# Patient Record
Sex: MALE | Race: WHITE | NOT HISPANIC OR LATINO | URBAN - METROPOLITAN AREA
[De-identification: names, ages, dates, MRNs, and addresses within clinical notes are randomized per-mention and may not be internally consistent; named-entity substitution may affect disease eponyms.]

---

## 2021-05-26 ENCOUNTER — EMERGENCY (EMERGENCY)
Facility: HOSPITAL | Age: 56
LOS: 0 days | Discharge: HOME | End: 2021-05-27
Attending: EMERGENCY MEDICINE
Payer: COMMERCIAL

## 2021-05-26 VITALS — WEIGHT: 210.1 LBS | HEIGHT: 70 IN

## 2021-05-26 DIAGNOSIS — R10.31 RIGHT LOWER QUADRANT PAIN: ICD-10-CM

## 2021-05-26 DIAGNOSIS — N13.2 HYDRONEPHROSIS WITH RENAL AND URETERAL CALCULOUS OBSTRUCTION: ICD-10-CM

## 2021-05-26 LAB
APPEARANCE UR: CLEAR — SIGNIFICANT CHANGE UP
APTT BLD: 22.6 SEC — LOW (ref 27.5–35.5)
BASOPHILS # BLD AUTO: 0 K/UL — SIGNIFICANT CHANGE UP (ref 0–0.2)
BASOPHILS NFR BLD AUTO: 0 % — SIGNIFICANT CHANGE UP (ref 0–2)
BILIRUB UR-MCNC: NEGATIVE — SIGNIFICANT CHANGE UP
COLOR SPEC: YELLOW — SIGNIFICANT CHANGE UP
DIFF PNL FLD: ABNORMAL
EOSINOPHIL # BLD AUTO: 1.26 K/UL — HIGH (ref 0–0.5)
EOSINOPHIL NFR BLD AUTO: 9 % — HIGH (ref 0–6)
GLUCOSE UR QL: NEGATIVE MG/DL — SIGNIFICANT CHANGE UP
HCT VFR BLD CALC: 47.1 % — SIGNIFICANT CHANGE UP (ref 39–50)
HGB BLD-MCNC: 16.6 G/DL — SIGNIFICANT CHANGE UP (ref 13–17)
INR BLD: 1.35 RATIO — HIGH (ref 0.88–1.16)
KETONES UR-MCNC: NEGATIVE — SIGNIFICANT CHANGE UP
LEUKOCYTE ESTERASE UR-ACNC: NEGATIVE — SIGNIFICANT CHANGE UP
LYMPHOCYTES # BLD AUTO: 17 % — SIGNIFICANT CHANGE UP (ref 13–44)
LYMPHOCYTES # BLD AUTO: 2.38 K/UL — SIGNIFICANT CHANGE UP (ref 1–3.3)
MCHC RBC-ENTMCNC: 30.2 PG — SIGNIFICANT CHANGE UP (ref 27–34)
MCHC RBC-ENTMCNC: 35.2 GM/DL — SIGNIFICANT CHANGE UP (ref 32–36)
MCV RBC AUTO: 85.6 FL — SIGNIFICANT CHANGE UP (ref 80–100)
MONOCYTES # BLD AUTO: 1.26 K/UL — HIGH (ref 0–0.9)
MONOCYTES NFR BLD AUTO: 9 % — SIGNIFICANT CHANGE UP (ref 2–14)
NEUTROPHILS # BLD AUTO: 8.95 K/UL — HIGH (ref 1.8–7.4)
NEUTROPHILS NFR BLD AUTO: 64 % — SIGNIFICANT CHANGE UP (ref 43–77)
NITRITE UR-MCNC: NEGATIVE — SIGNIFICANT CHANGE UP
NRBC # BLD: SIGNIFICANT CHANGE UP /100 WBCS (ref 0–0)
PH UR: 5 — SIGNIFICANT CHANGE UP (ref 5–8)
PLATELET # BLD AUTO: 288 K/UL — SIGNIFICANT CHANGE UP (ref 150–400)
POTASSIUM SERPL-MCNC: 4 MMOL/L — SIGNIFICANT CHANGE UP (ref 3.5–5.3)
POTASSIUM SERPL-SCNC: 4 MMOL/L — SIGNIFICANT CHANGE UP (ref 3.5–5.3)
PROT UR-MCNC: NEGATIVE MG/DL — SIGNIFICANT CHANGE UP
PROTHROM AB SERPL-ACNC: 15.4 SEC — HIGH (ref 10.6–13.6)
RBC # BLD: 5.5 M/UL — SIGNIFICANT CHANGE UP (ref 4.2–5.8)
RBC # FLD: 12.7 % — SIGNIFICANT CHANGE UP (ref 10.3–14.5)
SP GR SPEC: 1.02 — SIGNIFICANT CHANGE UP (ref 1.01–1.02)
UROBILINOGEN FLD QL: NEGATIVE MG/DL — SIGNIFICANT CHANGE UP
WBC # BLD: 13.99 K/UL — HIGH (ref 3.8–10.5)
WBC # FLD AUTO: 13.99 K/UL — HIGH (ref 3.8–10.5)

## 2021-05-26 PROCEDURE — 71045 X-RAY EXAM CHEST 1 VIEW: CPT

## 2021-05-26 PROCEDURE — 99285 EMERGENCY DEPT VISIT HI MDM: CPT

## 2021-05-26 PROCEDURE — 85730 THROMBOPLASTIN TIME PARTIAL: CPT

## 2021-05-26 PROCEDURE — 99284 EMERGENCY DEPT VISIT MOD MDM: CPT | Mod: 25

## 2021-05-26 PROCEDURE — 80053 COMPREHEN METABOLIC PANEL: CPT

## 2021-05-26 PROCEDURE — 86900 BLOOD TYPING SEROLOGIC ABO: CPT

## 2021-05-26 PROCEDURE — 81001 URINALYSIS AUTO W/SCOPE: CPT

## 2021-05-26 PROCEDURE — 86901 BLOOD TYPING SEROLOGIC RH(D): CPT

## 2021-05-26 PROCEDURE — 96374 THER/PROPH/DIAG INJ IV PUSH: CPT | Mod: XU

## 2021-05-26 PROCEDURE — 83690 ASSAY OF LIPASE: CPT

## 2021-05-26 PROCEDURE — 74177 CT ABD & PELVIS W/CONTRAST: CPT

## 2021-05-26 PROCEDURE — 36415 COLL VENOUS BLD VENIPUNCTURE: CPT

## 2021-05-26 PROCEDURE — 71045 X-RAY EXAM CHEST 1 VIEW: CPT | Mod: 26

## 2021-05-26 PROCEDURE — 85610 PROTHROMBIN TIME: CPT

## 2021-05-26 PROCEDURE — 85025 COMPLETE CBC W/AUTO DIFF WBC: CPT

## 2021-05-26 PROCEDURE — 86850 RBC ANTIBODY SCREEN: CPT

## 2021-05-26 RX ORDER — SODIUM CHLORIDE 9 MG/ML
1000 INJECTION INTRAMUSCULAR; INTRAVENOUS; SUBCUTANEOUS ONCE
Refills: 0 | Status: COMPLETED | OUTPATIENT
Start: 2021-05-26 | End: 2021-05-26

## 2021-05-26 RX ORDER — MORPHINE SULFATE 50 MG/1
4 CAPSULE, EXTENDED RELEASE ORAL ONCE
Refills: 0 | Status: DISCONTINUED | OUTPATIENT
Start: 2021-05-26 | End: 2021-05-26

## 2021-05-26 RX ORDER — ONDANSETRON 8 MG/1
4 TABLET, FILM COATED ORAL ONCE
Refills: 0 | Status: COMPLETED | OUTPATIENT
Start: 2021-05-26 | End: 2021-05-26

## 2021-05-26 RX ADMIN — SODIUM CHLORIDE 1000 MILLILITER(S): 9 INJECTION INTRAMUSCULAR; INTRAVENOUS; SUBCUTANEOUS at 23:19

## 2021-05-26 NOTE — ED ADULT TRIAGE NOTE - CHIEF COMPLAINT QUOTE
Pt. presents to ED c/o right lower quadrant abdominal pain. Pt. states pain began at appx. 5pm tonight. Denies N/V/D and fever

## 2021-05-27 VITALS
RESPIRATION RATE: 18 BRPM | DIASTOLIC BLOOD PRESSURE: 98 MMHG | HEART RATE: 83 BPM | OXYGEN SATURATION: 96 % | TEMPERATURE: 98 F | SYSTOLIC BLOOD PRESSURE: 135 MMHG

## 2021-05-27 LAB
ALBUMIN SERPL ELPH-MCNC: 4.1 G/DL — SIGNIFICANT CHANGE UP (ref 3.3–5)
ALP SERPL-CCNC: 74 U/L — SIGNIFICANT CHANGE UP (ref 40–120)
ALT FLD-CCNC: 61 U/L — SIGNIFICANT CHANGE UP (ref 12–78)
ANION GAP SERPL CALC-SCNC: 5 MMOL/L — SIGNIFICANT CHANGE UP (ref 5–17)
AST SERPL-CCNC: 45 U/L — HIGH (ref 15–37)
BILIRUB SERPL-MCNC: 0.8 MG/DL — SIGNIFICANT CHANGE UP (ref 0.2–1.2)
BUN SERPL-MCNC: 19 MG/DL — SIGNIFICANT CHANGE UP (ref 7–23)
CALCIUM SERPL-MCNC: 9.2 MG/DL — SIGNIFICANT CHANGE UP (ref 8.5–10.1)
CHLORIDE SERPL-SCNC: 105 MMOL/L — SIGNIFICANT CHANGE UP (ref 96–108)
CO2 SERPL-SCNC: 28 MMOL/L — SIGNIFICANT CHANGE UP (ref 22–31)
CREAT SERPL-MCNC: 1.93 MG/DL — HIGH (ref 0.5–1.3)
GLUCOSE SERPL-MCNC: 105 MG/DL — HIGH (ref 70–99)
LIDOCAIN IGE QN: 191 U/L — SIGNIFICANT CHANGE UP (ref 73–393)
PROT SERPL-MCNC: 7.9 GM/DL — SIGNIFICANT CHANGE UP (ref 6–8.3)
SODIUM SERPL-SCNC: 138 MMOL/L — SIGNIFICANT CHANGE UP (ref 135–145)

## 2021-05-27 PROCEDURE — 74177 CT ABD & PELVIS W/CONTRAST: CPT | Mod: 26,MA

## 2021-05-27 RX ORDER — ONDANSETRON 8 MG/1
1 TABLET, FILM COATED ORAL
Qty: 12 | Refills: 0
Start: 2021-05-27 | End: 2021-05-29

## 2021-05-27 RX ORDER — OXYCODONE HYDROCHLORIDE 5 MG/1
1 TABLET ORAL
Qty: 20 | Refills: 0
Start: 2021-05-27 | End: 2021-05-31

## 2021-05-27 RX ORDER — ACETAMINOPHEN 500 MG
1000 TABLET ORAL ONCE
Refills: 0 | Status: COMPLETED | OUTPATIENT
Start: 2021-05-27 | End: 2021-05-27

## 2021-05-27 RX ORDER — KETOROLAC TROMETHAMINE 30 MG/ML
30 SYRINGE (ML) INJECTION ONCE
Refills: 0 | Status: DISCONTINUED | OUTPATIENT
Start: 2021-05-27 | End: 2021-05-27

## 2021-05-27 RX ORDER — TAMSULOSIN HYDROCHLORIDE 0.4 MG/1
1 CAPSULE ORAL
Qty: 14 | Refills: 0
Start: 2021-05-27 | End: 2021-06-09

## 2021-05-27 RX ADMIN — Medication 30 MILLIGRAM(S): at 00:58

## 2021-05-27 NOTE — ED ADULT NURSE NOTE - OBJECTIVE STATEMENT
54 y/o a&0x4 male presents to ED c/o right lower quadrant abdominal pain. Pt. states pain began at appx. 5pm tonight. Denies N/V/D and fever, denies blood in urine or stool. pt stated he did not take anything for the pain, rates pain at an "8",

## 2021-05-27 NOTE — ED PROVIDER NOTE - PHYSICAL EXAMINATION
Gen:  Well appearing in NAD  Head:  NC/AT  HEENT: pupils perrl,no pharyngeal erythema, uvula midline  Cardiac: S1S2, RRR  Abd: Soft, right middle quadrant ttp, no ttp testicles  Resp: No distress, CTA   musculoskeletal:: no deformities, no swelling, strength +5/+5  Skin: warm and dry as visualized, no rashes  Neuro: MURRAY,  aao x 4  Psych:alert, cooperative, appropriate mood and affect for situation

## 2021-05-27 NOTE — ED PROVIDER NOTE - CARE PLAN
Principal Discharge DX:	Renal colic on right side   Principal Discharge DX:	Renal colic on right side  Secondary Diagnosis:	Nephrolithiasis  Secondary Diagnosis:	Hydronephrosis due to obstruction of ureter

## 2021-05-27 NOTE — ED PROVIDER NOTE - CARE PROVIDER_API CALL
Scotty Chapman)  Urology  [address], [address] [place] [address]  Phone: [phone]  Fax: [phone]  Follow Up Time:

## 2021-05-27 NOTE — ED PROVIDER NOTE - CLINICAL SUMMARY MEDICAL DECISION MAKING FREE TEXT BOX
pt with right sdided abd pain will get labs, iv tylenol as pt does not want morphine and ct r/o appy vs kidney stone

## 2021-05-27 NOTE — ED PROVIDER NOTE - NSFOLLOWUPINSTRUCTIONS_ED_ALL_ED_FT
WHAT YOU NEED TO KNOW:    Renal colic is severe pain in your lower back or sides. The pain is usually on one side, but may be on both sides of your lower back. Renal colic may start quickly, come and go, and become worse over time. Renal colic is caused by a blockage in your urinary tract. The most common cause of a blockage is a kidney stone. Blood clots, ureter spasms, and dead tissue may also block your urinary tract.    Female Urinary System                DISCHARGE INSTRUCTIONS:    Return to the emergency department if:   •You cannot stop vomiting.      •You see new or increased bleeding when you urinate.      •You are urinating less than usual, or not at all.      •Your pain is not getting better even after treatment.      Call your doctor if:   •You have fever.      •You need to urinate more often than usual, or right away.      •You see a stone in your urine strainer after you urinate.      •You have questions or concerns about your condition or care.      Medicines:   •Medicines may help decrease pain and muscle spasms. You may also need medicine to calm your stomach and stop vomiting.      •Take your medicine as directed. Contact your healthcare provider if you think your medicine is not helping or if you have side effects. Tell him of her if you are allergic to any medicine. Keep a list of the medicines, vitamins, and herbs you take. Include the amounts, and when and why you take them. Bring the list or the pill bottles to follow-up visits. Carry your medicine list with you in case of an emergency.      Manage your symptoms:   •Drink liquids as directed. This will help decrease pain and flush blockages from your urinary tract. Ask how much liquid to drink each day and which liquids are best for you. You may need to drink about 3 liters (12 glasses) of liquids each day. Half of your total daily liquids should be water. Limit coffee, tea, and soda to 2 cups daily. Your urine should be pale and clear.      •Strain your urine every time you urinate. Urinate into a strainer (funnel with a fine mesh on the bottom) or glass jar to collect kidney stones. Give the kidney stones to your healthcare provider at your next visit.  Look for Stones in the Filter           •Eat a variety of healthy foods. Healthy foods include fruits, vegetables, whole-grain breads, low-fat dairy products, beans, lean meats, and fish. You may need to increase the amount of citrus fruit you eat, such as oranges. Ask your healthcare provider how much salt, calcium, and protein you should eat

## 2021-05-27 NOTE — ED PROVIDER NOTE - PATIENT PORTAL LINK FT
You can access the FollowMyHealth Patient Portal offered by [facility] by registering at the following website: [email or web address]. By joining [employer]’s FollowMyHealth portal, you will also be able to view your health information using other applications (apps) compatible with our system.

## 2021-05-31 ENCOUNTER — EMERGENCY (EMERGENCY)
Facility: HOSPITAL | Age: 56
LOS: 0 days | Discharge: HOME | End: 2021-05-31
Attending: EMERGENCY MEDICINE
Payer: COMMERCIAL

## 2021-05-31 VITALS
OXYGEN SATURATION: 99 % | DIASTOLIC BLOOD PRESSURE: 107 MMHG | RESPIRATION RATE: 18 BRPM | SYSTOLIC BLOOD PRESSURE: 160 MMHG | HEART RATE: 87 BPM | TEMPERATURE: 99 F

## 2021-05-31 VITALS — HEIGHT: 70 IN | WEIGHT: 220.02 LBS

## 2021-05-31 DIAGNOSIS — R10.9 UNSPECIFIED ABDOMINAL PAIN: ICD-10-CM

## 2021-05-31 DIAGNOSIS — M54.9 DORSALGIA, UNSPECIFIED: ICD-10-CM

## 2021-05-31 LAB
APPEARANCE UR: CLEAR — SIGNIFICANT CHANGE UP
BILIRUB UR-MCNC: NEGATIVE — SIGNIFICANT CHANGE UP
COLOR SPEC: YELLOW — SIGNIFICANT CHANGE UP
DIFF PNL FLD: ABNORMAL
GLUCOSE UR QL: NEGATIVE MG/DL — SIGNIFICANT CHANGE UP
KETONES UR-MCNC: NEGATIVE — SIGNIFICANT CHANGE UP
LEUKOCYTE ESTERASE UR-ACNC: NEGATIVE — SIGNIFICANT CHANGE UP
NITRITE UR-MCNC: NEGATIVE — SIGNIFICANT CHANGE UP
PH UR: 5 — SIGNIFICANT CHANGE UP (ref 5–8)
PROT UR-MCNC: NEGATIVE MG/DL — SIGNIFICANT CHANGE UP
SP GR SPEC: 1.01 — SIGNIFICANT CHANGE UP (ref 1.01–1.02)
UROBILINOGEN FLD QL: NEGATIVE MG/DL — SIGNIFICANT CHANGE UP

## 2021-05-31 PROCEDURE — 81001 URINALYSIS AUTO W/SCOPE: CPT

## 2021-05-31 PROCEDURE — 99284 EMERGENCY DEPT VISIT MOD MDM: CPT

## 2021-05-31 PROCEDURE — 87086 URINE CULTURE/COLONY COUNT: CPT

## 2021-05-31 PROCEDURE — 76770 US EXAM ABDO BACK WALL COMP: CPT | Mod: 26

## 2021-05-31 PROCEDURE — 99284 EMERGENCY DEPT VISIT MOD MDM: CPT | Mod: 25

## 2021-05-31 PROCEDURE — 76770 US EXAM ABDO BACK WALL COMP: CPT

## 2021-05-31 NOTE — ED STATDOCS - NSFOLLOWUPINSTRUCTIONS_ED_ALL_ED_FT
Flank Pain    WHAT YOU NEED TO KNOW:    Flank pain is felt in the area below your ribcage and above your hip bones, often in the lower back. Your pain may be dull or so severe that you cannot get comfortable. The pain may stay in one area or radiate to another area. It may worsen and lighten in waves. Flank pain is often a sign of problems with your urinary tract, such as a kidney stone or infection.     DISCHARGE INSTRUCTIONS:    Return to the emergency department if:   •You have a fever.       •Your heart is fluttering or jumping.       •You see blood in your urine.       •Your pain radiates into your lower abdomen and genital area.       •You have intense pain in your low back next to your spine.       •You are much more tired than usual and have no desire to eat.       •You have a headache and your muscles jerk.       Contact your healthcare provider if:   •You have an upset stomach and are vomiting.      •You have to urinate more often, and with urgency.       •Your pain worsens or does not improve, and you cannot get comfortable.       •You pass a stone when you urinate.      •You have questions or concerns about your condition or care.      Medicines: The following medicines may be ordered for you:  •Pain medicine may help decrease or relieve your pain. Do not wait until the pain is severe before you take your medicine.       •Antibiotics may help treat a urinary tract infection caused by bacteria.       •Take your medicine as directed. Contact your healthcare provider if you think your medicine is not helping or if you have side effects. Tell him of her if you are allergic to any medicine. Keep a list of the medicines, vitamins, and herbs you take. Include the amounts, and when and why you take them. Bring the list or the pill bottles to follow-up visits. Carry your medicine list with you in case of an emergency.      Follow up with your healthcare provider in 1 to 2 days or as directed: Write down your questions so you remember to ask them during your visits.

## 2021-05-31 NOTE — ED STATDOCS - PROGRESS NOTE DETAILS
kidney stone passed, suspect dermoid cyst on R flank as well which may be irritated secondary to patient palpating it too much.  No sign of infection, overlying skin without warmth or erythema.  Follow up and return precautions reviewed -Sherri Blair PA-C

## 2021-05-31 NOTE — ED ADULT TRIAGE NOTE - CHIEF COMPLAINT QUOTE
Patient presents in ED with " I feel a lump on the right side of my back". History of Cysts. Patient states " I noticed the growth on Friday night ". Patient was recently seen in ED and treated for a kidney stone. Patient denies fevers.

## 2021-05-31 NOTE — ED STATDOCS - PATIENT PORTAL LINK FT
You can access the FollowMyHealth Patient Portal offered by [facility] by registering at the following website: [email or web address]. By joining [employer]’s FollowMyHealth portal, you will also be able to view your health information using other applications (apps) compatible with our system.

## 2021-05-31 NOTE — ED STATDOCS - OBJECTIVE STATEMENT
56 y/o male with PMHx of cysts presents to the ED c/o right flank pain. Pt states that he was recently seen in  ED on 5/26, states he was diagnosed with kidney stones and d/c home. Pt states that kidney stone moved to bladder on 5/27, states on 5/28 he was seen by PMD who set up pt with Urologist. Pt reports that since the night of 5/28, he's had sharp pain on right side of back. Pt reports pain improved on night of 5/30, however since this afternoon has been acutely worsening which prompted ED visit. Denies fevers, hematuria. No other complaints at this time.

## 2021-06-01 LAB
CULTURE RESULTS: NO GROWTH — SIGNIFICANT CHANGE UP
SPECIMEN SOURCE: SIGNIFICANT CHANGE UP

## 2022-02-15 ENCOUNTER — INPATIENT (INPATIENT)
Facility: HOSPITAL | Age: 57
LOS: 1 days | Discharge: HOME | DRG: 299 | End: 2022-02-17
Attending: FAMILY MEDICINE | Admitting: HOSPITALIST
Payer: COMMERCIAL

## 2022-02-15 VITALS — HEIGHT: 70 IN | WEIGHT: 220.02 LBS

## 2022-02-15 DIAGNOSIS — I26.99 OTHER PULMONARY EMBOLISM WITHOUT ACUTE COR PULMONALE: ICD-10-CM

## 2022-02-15 DIAGNOSIS — I80.229 PHLEBITIS AND THROMBOPHLEBITIS OF UNSPECIFIED POPLITEAL VEIN: ICD-10-CM

## 2022-02-15 LAB
ADD ON TEST-SPECIMEN IN LAB: SIGNIFICANT CHANGE UP
ALBUMIN SERPL ELPH-MCNC: 3.9 G/DL — SIGNIFICANT CHANGE UP (ref 3.3–5)
ALP SERPL-CCNC: 82 U/L — SIGNIFICANT CHANGE UP (ref 40–120)
ALT FLD-CCNC: 40 U/L — SIGNIFICANT CHANGE UP (ref 12–78)
ANION GAP SERPL CALC-SCNC: 4 MMOL/L — LOW (ref 5–17)
AST SERPL-CCNC: 28 U/L — SIGNIFICANT CHANGE UP (ref 15–37)
BASOPHILS # BLD AUTO: 0.12 K/UL — SIGNIFICANT CHANGE UP (ref 0–0.2)
BASOPHILS NFR BLD AUTO: 1 % — SIGNIFICANT CHANGE UP (ref 0–2)
BILIRUB SERPL-MCNC: 0.5 MG/DL — SIGNIFICANT CHANGE UP (ref 0.2–1.2)
BUN SERPL-MCNC: 14 MG/DL — SIGNIFICANT CHANGE UP (ref 7–23)
CALCIUM SERPL-MCNC: 9.5 MG/DL — SIGNIFICANT CHANGE UP (ref 8.5–10.1)
CHLORIDE SERPL-SCNC: 107 MMOL/L — SIGNIFICANT CHANGE UP (ref 96–108)
CO2 SERPL-SCNC: 28 MMOL/L — SIGNIFICANT CHANGE UP (ref 22–31)
CREAT SERPL-MCNC: 1.39 MG/DL — HIGH (ref 0.5–1.3)
EOSINOPHIL # BLD AUTO: 1.17 K/UL — HIGH (ref 0–0.5)
EOSINOPHIL NFR BLD AUTO: 9.7 % — HIGH (ref 0–6)
GLUCOSE SERPL-MCNC: 126 MG/DL — HIGH (ref 70–99)
HCT VFR BLD CALC: 48.5 % — SIGNIFICANT CHANGE UP (ref 39–50)
HGB BLD-MCNC: 16.9 G/DL — SIGNIFICANT CHANGE UP (ref 13–17)
IMM GRANULOCYTES NFR BLD AUTO: 0.4 % — SIGNIFICANT CHANGE UP (ref 0–1.5)
LYMPHOCYTES # BLD AUTO: 16.9 % — SIGNIFICANT CHANGE UP (ref 13–44)
LYMPHOCYTES # BLD AUTO: 2.04 K/UL — SIGNIFICANT CHANGE UP (ref 1–3.3)
MCHC RBC-ENTMCNC: 29.9 PG — SIGNIFICANT CHANGE UP (ref 27–34)
MCHC RBC-ENTMCNC: 34.8 GM/DL — SIGNIFICANT CHANGE UP (ref 32–36)
MCV RBC AUTO: 85.8 FL — SIGNIFICANT CHANGE UP (ref 80–100)
MONOCYTES # BLD AUTO: 0.94 K/UL — HIGH (ref 0–0.9)
MONOCYTES NFR BLD AUTO: 7.8 % — SIGNIFICANT CHANGE UP (ref 2–14)
NEUTROPHILS # BLD AUTO: 7.76 K/UL — HIGH (ref 1.8–7.4)
NEUTROPHILS NFR BLD AUTO: 64.2 % — SIGNIFICANT CHANGE UP (ref 43–77)
PLATELET # BLD AUTO: 256 K/UL — SIGNIFICANT CHANGE UP (ref 150–400)
POTASSIUM SERPL-MCNC: 4.4 MMOL/L — SIGNIFICANT CHANGE UP (ref 3.5–5.3)
POTASSIUM SERPL-SCNC: 4.4 MMOL/L — SIGNIFICANT CHANGE UP (ref 3.5–5.3)
PROT SERPL-MCNC: 8 GM/DL — SIGNIFICANT CHANGE UP (ref 6–8.3)
RAPID RVP RESULT: SIGNIFICANT CHANGE UP
RBC # BLD: 5.65 M/UL — SIGNIFICANT CHANGE UP (ref 4.2–5.8)
RBC # FLD: 12.6 % — SIGNIFICANT CHANGE UP (ref 10.3–14.5)
SARS-COV-2 RNA SPEC QL NAA+PROBE: SIGNIFICANT CHANGE UP
SODIUM SERPL-SCNC: 139 MMOL/L — SIGNIFICANT CHANGE UP (ref 135–145)
TROPONIN I, HIGH SENSITIVITY RESULT: 6.54 NG/L — SIGNIFICANT CHANGE UP
WBC # BLD: 12.08 K/UL — HIGH (ref 3.8–10.5)
WBC # FLD AUTO: 12.08 K/UL — HIGH (ref 3.8–10.5)

## 2022-02-15 PROCEDURE — 36415 COLL VENOUS BLD VENIPUNCTURE: CPT

## 2022-02-15 PROCEDURE — 80048 BASIC METABOLIC PNL TOTAL CA: CPT

## 2022-02-15 PROCEDURE — 93306 TTE W/DOPPLER COMPLETE: CPT

## 2022-02-15 PROCEDURE — 85652 RBC SED RATE AUTOMATED: CPT

## 2022-02-15 PROCEDURE — 93970 EXTREMITY STUDY: CPT

## 2022-02-15 PROCEDURE — 93970 EXTREMITY STUDY: CPT | Mod: 26

## 2022-02-15 PROCEDURE — 93306 TTE W/DOPPLER COMPLETE: CPT | Mod: 26

## 2022-02-15 PROCEDURE — 84156 ASSAY OF PROTEIN URINE: CPT

## 2022-02-15 PROCEDURE — 85025 COMPLETE CBC W/AUTO DIFF WBC: CPT

## 2022-02-15 PROCEDURE — 86140 C-REACTIVE PROTEIN: CPT

## 2022-02-15 PROCEDURE — 86039 ANTINUCLEAR ANTIBODIES (ANA): CPT

## 2022-02-15 PROCEDURE — 81001 URINALYSIS AUTO W/SCOPE: CPT

## 2022-02-15 PROCEDURE — 85027 COMPLETE CBC AUTOMATED: CPT

## 2022-02-15 PROCEDURE — 93975 VASCULAR STUDY: CPT

## 2022-02-15 PROCEDURE — 86225 DNA ANTIBODY NATIVE: CPT

## 2022-02-15 PROCEDURE — 83735 ASSAY OF MAGNESIUM: CPT

## 2022-02-15 PROCEDURE — 93010 ELECTROCARDIOGRAM REPORT: CPT

## 2022-02-15 PROCEDURE — 0225U NFCT DS DNA&RNA 21 SARSCOV2: CPT

## 2022-02-15 PROCEDURE — 99285 EMERGENCY DEPT VISIT HI MDM: CPT

## 2022-02-15 PROCEDURE — 82570 ASSAY OF URINE CREATININE: CPT

## 2022-02-15 PROCEDURE — 86255 FLUORESCENT ANTIBODY SCREEN: CPT

## 2022-02-15 PROCEDURE — 99223 1ST HOSP IP/OBS HIGH 75: CPT

## 2022-02-15 PROCEDURE — 71275 CT ANGIOGRAPHY CHEST: CPT | Mod: 26,MA

## 2022-02-15 RX ORDER — ENOXAPARIN SODIUM 100 MG/ML
100 INJECTION SUBCUTANEOUS ONCE
Refills: 0 | Status: COMPLETED | OUTPATIENT
Start: 2022-02-15 | End: 2022-02-15

## 2022-02-15 RX ORDER — CHOLECALCIFEROL (VITAMIN D3) 125 MCG
1 CAPSULE ORAL
Qty: 0 | Refills: 0 | DISCHARGE

## 2022-02-15 RX ORDER — CALCIUM CARBONATE 500(1250)
3 TABLET ORAL EVERY 6 HOURS
Refills: 0 | Status: DISCONTINUED | OUTPATIENT
Start: 2022-02-15 | End: 2022-02-17

## 2022-02-15 RX ORDER — HEPARIN SODIUM 5000 [USP'U]/ML
8500 INJECTION INTRAVENOUS; SUBCUTANEOUS ONCE
Refills: 0 | Status: DISCONTINUED | OUTPATIENT
Start: 2022-02-15 | End: 2022-02-15

## 2022-02-15 RX ORDER — HEPARIN SODIUM 5000 [USP'U]/ML
4000 INJECTION INTRAVENOUS; SUBCUTANEOUS EVERY 6 HOURS
Refills: 0 | Status: DISCONTINUED | OUTPATIENT
Start: 2022-02-15 | End: 2022-02-15

## 2022-02-15 RX ORDER — ONDANSETRON 8 MG/1
4 TABLET, FILM COATED ORAL EVERY 6 HOURS
Refills: 0 | Status: DISCONTINUED | OUTPATIENT
Start: 2022-02-15 | End: 2022-02-17

## 2022-02-15 RX ORDER — OMEGA-3 ACID ETHYL ESTERS 1 G
1 CAPSULE ORAL
Qty: 0 | Refills: 0 | DISCHARGE

## 2022-02-15 RX ORDER — HEPARIN SODIUM 5000 [USP'U]/ML
INJECTION INTRAVENOUS; SUBCUTANEOUS
Qty: 25000 | Refills: 0 | Status: DISCONTINUED | OUTPATIENT
Start: 2022-02-15 | End: 2022-02-15

## 2022-02-15 RX ORDER — SENNA PLUS 8.6 MG/1
2 TABLET ORAL AT BEDTIME
Refills: 0 | Status: DISCONTINUED | OUTPATIENT
Start: 2022-02-15 | End: 2022-02-17

## 2022-02-15 RX ORDER — HEPARIN SODIUM 5000 [USP'U]/ML
8500 INJECTION INTRAVENOUS; SUBCUTANEOUS EVERY 6 HOURS
Refills: 0 | Status: DISCONTINUED | OUTPATIENT
Start: 2022-02-15 | End: 2022-02-15

## 2022-02-15 RX ORDER — ENOXAPARIN SODIUM 100 MG/ML
100 INJECTION SUBCUTANEOUS EVERY 12 HOURS
Refills: 0 | Status: DISCONTINUED | OUTPATIENT
Start: 2022-02-15 | End: 2022-02-16

## 2022-02-15 RX ORDER — PREGABALIN 225 MG/1
1 CAPSULE ORAL
Qty: 0 | Refills: 0 | DISCHARGE

## 2022-02-15 RX ORDER — LANOLIN ALCOHOL/MO/W.PET/CERES
1 CREAM (GRAM) TOPICAL
Qty: 0 | Refills: 0 | DISCHARGE

## 2022-02-15 RX ORDER — SODIUM CHLORIDE 0.65 %
1 AEROSOL, SPRAY (ML) NASAL
Qty: 0 | Refills: 0 | DISCHARGE

## 2022-02-15 RX ADMIN — ENOXAPARIN SODIUM 100 MILLIGRAM(S): 100 INJECTION SUBCUTANEOUS at 16:10

## 2022-02-15 RX ADMIN — ENOXAPARIN SODIUM 100 MILLIGRAM(S): 100 INJECTION SUBCUTANEOUS at 22:01

## 2022-02-15 NOTE — ED STATDOCS - ATTENDING CONTRIBUTION TO CARE
I, Kia Wyman MD,  performed the initial face to face bedside interview with this patient regarding history of present illness, review of symptoms and relevant past medical, social and family history.  I completed an independent physical examination.  I was the initial provider who evaluated this patient.   I personally saw the patient and performed a substantive portion of the visit including all aspects of the medical decision making.  I have signed out the follow up of any pending tests (i.e. labs, radiological studies) to the ACP.  I have communicated the patient’s plan of care and disposition with the ACP.  The history, relevant review of systems, past medical and surgical history, medical decision making, and physical examination was documented by the scribe in my presence and I attest to the accuracy of the documentation.

## 2022-02-15 NOTE — ED ADULT NURSE NOTE - OBJECTIVE STATEMENT
Pt presented to the ER with c/o midsternal chest pain which started Friday after lifting weights. Pt stated that the pain went away and returned today which prompted to go to the ER. Pt has a hx of PE. Pt denies any dizziness, SOB, or N/V at this time.

## 2022-02-15 NOTE — ED STATDOCS - OBJECTIVE STATEMENT
57 y/o male with a PMHx of PE 5 years ago no longer on anticoagulation presents to the ED c/o intermittent CP x4 days. Pt notes he lifted weights prior to the onset of pain. NKDA. Nonsmoker. No other complaints at this time.

## 2022-02-15 NOTE — ED STATDOCS - PROGRESS NOTE DETAILS
57 y/o M presents with chest pain x 4 days. Pt reports intermittent chest pain worse with taking a deep breath in since wednesday. Prior hx of PE in 2017, was on eliquis for 6 months, ct showed PE resolved and taken off meds. No known blood disorders. Denies fever/chills, n/v, SOB, LE swelling or other complaints at this time. -Danny Biswas PA-C Case discussed w/ Dr. lucero who will admit. -Danny Biswas PA-C

## 2022-02-15 NOTE — PATIENT PROFILE ADULT - FALL HARM RISK - UNIVERSAL INTERVENTIONS
Bed in lowest position, wheels locked, appropriate side rails in place/Call bell, personal items and telephone in reach/Instruct patient to call for assistance before getting out of bed or chair/Non-slip footwear when patient is out of bed/[place] to call system/Physically safe environment - no spills, clutter or unnecessary equipment/Purposeful Proactive Rounding/Room/bathroom lighting operational, light cord in reach

## 2022-02-15 NOTE — H&P ADULT - NSHPPHYSICALEXAM_GEN_ALL_CORE
PHYSICAL EXAM:    T(C): 36.9 (02-15-22 @ 12:50), Max: 36.9 (02-15-22 @ 12:50)  HR: 81 (02-15-22 @ 14:26) (81 - 96)  BP: 156/99 (02-15-22 @ 14:26) (156/99 - 178/113)  RR: 18 (02-15-22 @ 12:50) (18 - 18)  SpO2: 98% (02-15-22 @ 14:26) (96% - 98%)    General: AAOx3; NAD  Head: AT/NC  ENT: Moist Mucous Membranes; No Injury  Eyes: EOMI; PERRL  Neck: Non-tender; No JVD  CVS: RRR, S1&S2, No murmur, Trace LLE Edema  Respiratory: Lungs CTA B/L; Normal Respiratory Effort  Abdomen/GI: Soft, non-tender, non-distended, no guarding, no rebound, normal bowel sounds  : No bladder distention, No Hdz  Extremities: No cyanosis, No clubbing, Trace LLE edema  MSK: No CVA tenderness, Normal ROM, No injury  Neuro: AAOx3, CNII-XII grossly intact, non-focal  Psych: Appropriate, Cooperative,  Skin: Clean, Dry and Intact

## 2022-02-15 NOTE — H&P ADULT - ASSESSMENT
MEDICATIONS  (STANDING):  enoxaparin Injectable 100 milliGRAM(s) SubCutaneous every 12 hours  multivitamin 1 Tablet(s) Oral daily    MEDICATIONS  (PRN):  aluminum hydroxide/magnesium hydroxide/simethicone Suspension 30 milliLiter(s) Oral every 4 hours PRN Dyspepsia  calcium carbonate    500 mG (Tums) Chewable 3 Tablet(s) Chew every 6 hours PRN Dyspepsia  ondansetron Injectable 4 milliGRAM(s) IV Push every 6 hours PRN Nausea  senna 2 Tablet(s) Oral at bedtime PRN Constipation      ASSESSMENT/PLAN    Recurrent Pulmonary Embolism Bilateral Main Stem (unprovoked)  Personal History of Pulmonary Embolism 2017 s/p 6 months Eliquis with reported subsequent resolution and discontinuation of AC  Chest pain due to above.  Reactive Leukocytosis due to above. May be chronic. WBC 13K in May 2021  -Troponin negative. Continue to trend  -TTE  -CTA/LE US findings noted. No signs of pneumonia  -Lovenox subq->transition to DOAC of choice (patient reports being eliquis if tolerating lovenox subq)  -Hemodynamically stable and not hypoxic. O2 PRN if and only if necessary   -Given the nature of recurrent of PE and bialteral DVT without any provoking features, will likely require life long AC. Can follow up outpatient for hypercoaguable panel  -Hgb on the high normal. Reported snoring at night. May benefit from outpatient sleep study and monitoring of Hgb/hct (for primary vs secondary polycythemia)    HTN Urgency POA  -PRN Rx for elevated BP.  -could be increased due to situational vs PE  -May required scheduled rx for better BP control.     CKD III. At baseline  -Continue to monitor Cr and Electrolytes.    DVT Prophylaxis: Lovenox subq

## 2022-02-15 NOTE — H&P ADULT - NSHPLABSRESULTS_GEN_ALL_CORE
16.9   12.08 )-----------( 256      ( 15 Feb 2022 13:14 )             48.5     02-15    139  |  107  |  14  ----------------------------<  126<H>  4.4   |  28  |  1.39<H>    Ca    9.5      15 Feb 2022 13:14    TPro  8.0  /  Alb  3.9  /  TBili  0.5  /  DBili  x   /  AST  28  /  ALT  40  /  AlkPhos  82  02-15    SARS-CoV-2: NotDetec (15 Feb 2022 16:03)    CAPILLARY BLOOD GLUCOSE      Prothrombin Time and INR, Plasma (02.15.22 @ 13:50)   Prothrombin Time, Plasma: 17.1 sec   INR: 1.50: Serum Pro-Brain Natriuretic Peptide (02.15.22 @ 13:14)   Serum Pro-Brain Natriuretic Peptide: 88 pg/mL Troponin I, High Sensitivity (02.15.22 @ 13:14)   Troponin I, High Sensitivity Result: 6.54:    RADIOLOGY:    < from: US Duplex Venous Lower Ext Complete, Bilateral (02.15.22 @ 15:39) >      IMPRESSION:  Thrombosis of the left popliteal vein and left posterior tibial vein.   Thrombosis of the right gastrocnemius vein.    < end of copied text >    < from: CT Angio Chest PE Protocol w/ IV Cont (02.15.22 @ 13:47) >    IMPRESSION:    Pulmonary emboli within the bilateral main pulmonary arteries extending   into the lobar branches. No CT evidence of right heart strain.    < end of copied text >    I personally reviewed labs, imaging, orders and vitals.

## 2022-02-15 NOTE — H&P ADULT - HISTORY OF PRESENT ILLNESS
56M with  PMH of PE (dx'ed 2017; on AC w Eliquis x 6mths and subsequent cleared to be off AC) presents with chest pain. Chest Pain 4-6/10 substernal/left sided pressure/aching pain without radiation. Similar pain to prior PE but different location (previously pain was on left lower chest wall along anterior axillary line). Associated non-productive cough, pleurisy and fatigue. Denies fever, chills, syncope, nausea, vomiting, abdominal pain/discomfort, weight gain/loss. Of note patient with left leg cramping and ankle swelling that was present several weeks ago. Wore compression stalking with improvement in symptoms.     In the ER, afebrile, /113, RR 18-20, 98% on RA, WBC 12.08, Cr 1.39 (Baseline), Troponin/BNP Negative, CTA chest with bilateral main pulmonary artery PE's without signs of heart strain.

## 2022-02-15 NOTE — H&P ADULT - NSICDXFAMILYHX_GEN_ALL_CORE_FT
FAMILY HISTORY:  Father  Still living? No  FH: CHF (congestive heart failure), Age at diagnosis: Age Unknown    Mother  Still living? No  FH: multiple myeloma, Age at diagnosis: Age Unknown

## 2022-02-15 NOTE — ED ADULT TRIAGE NOTE - CHIEF COMPLAINT QUOTE
patient presenting ambulatory to ED c/o midsternal chest pain that started friday after working out at the gym. patient states pain went away for the last couple of days but came back this morning. hx of PE 5 years ago. patient took an ecotrim 325 this morning.

## 2022-02-16 DIAGNOSIS — I82.402 ACUTE EMBOLISM AND THROMBOSIS OF UNSPECIFIED DEEP VEINS OF LEFT LOWER EXTREMITY: ICD-10-CM

## 2022-02-16 DIAGNOSIS — I26.99 OTHER PULMONARY EMBOLISM WITHOUT ACUTE COR PULMONALE: ICD-10-CM

## 2022-02-16 LAB
ANION GAP SERPL CALC-SCNC: 5 MMOL/L — SIGNIFICANT CHANGE UP (ref 5–17)
APPEARANCE UR: CLEAR — SIGNIFICANT CHANGE UP
BASOPHILS # BLD AUTO: 0.1 K/UL — SIGNIFICANT CHANGE UP (ref 0–0.2)
BASOPHILS NFR BLD AUTO: 1 % — SIGNIFICANT CHANGE UP (ref 0–2)
BILIRUB UR-MCNC: NEGATIVE — SIGNIFICANT CHANGE UP
BUN SERPL-MCNC: 15 MG/DL — SIGNIFICANT CHANGE UP (ref 7–23)
CALCIUM SERPL-MCNC: 9.3 MG/DL — SIGNIFICANT CHANGE UP (ref 8.5–10.1)
CHLORIDE SERPL-SCNC: 104 MMOL/L — SIGNIFICANT CHANGE UP (ref 96–108)
CO2 SERPL-SCNC: 29 MMOL/L — SIGNIFICANT CHANGE UP (ref 22–31)
COLOR SPEC: YELLOW — SIGNIFICANT CHANGE UP
CREAT ?TM UR-MCNC: 107 MG/DL — SIGNIFICANT CHANGE UP
CREAT SERPL-MCNC: 1.32 MG/DL — HIGH (ref 0.5–1.3)
DIFF PNL FLD: ABNORMAL
EOSINOPHIL # BLD AUTO: 1.94 K/UL — HIGH (ref 0–0.5)
EOSINOPHIL NFR BLD AUTO: 19.6 % — HIGH (ref 0–6)
GLUCOSE SERPL-MCNC: 109 MG/DL — HIGH (ref 70–99)
GLUCOSE UR QL: NEGATIVE — SIGNIFICANT CHANGE UP
HCT VFR BLD CALC: 48.6 % — SIGNIFICANT CHANGE UP (ref 39–50)
HGB BLD-MCNC: 16.7 G/DL — SIGNIFICANT CHANGE UP (ref 13–17)
IMM GRANULOCYTES NFR BLD AUTO: 0.3 % — SIGNIFICANT CHANGE UP (ref 0–1.5)
KETONES UR-MCNC: NEGATIVE — SIGNIFICANT CHANGE UP
LEUKOCYTE ESTERASE UR-ACNC: NEGATIVE — SIGNIFICANT CHANGE UP
LYMPHOCYTES # BLD AUTO: 2.22 K/UL — SIGNIFICANT CHANGE UP (ref 1–3.3)
LYMPHOCYTES # BLD AUTO: 22.4 % — SIGNIFICANT CHANGE UP (ref 13–44)
MAGNESIUM SERPL-MCNC: 2.4 MG/DL — SIGNIFICANT CHANGE UP (ref 1.6–2.6)
MCHC RBC-ENTMCNC: 29.6 PG — SIGNIFICANT CHANGE UP (ref 27–34)
MCHC RBC-ENTMCNC: 34.4 GM/DL — SIGNIFICANT CHANGE UP (ref 32–36)
MCV RBC AUTO: 86.2 FL — SIGNIFICANT CHANGE UP (ref 80–100)
MONOCYTES # BLD AUTO: 0.95 K/UL — HIGH (ref 0–0.9)
MONOCYTES NFR BLD AUTO: 9.6 % — SIGNIFICANT CHANGE UP (ref 2–14)
NEUTROPHILS # BLD AUTO: 4.65 K/UL — SIGNIFICANT CHANGE UP (ref 1.8–7.4)
NEUTROPHILS NFR BLD AUTO: 47.1 % — SIGNIFICANT CHANGE UP (ref 43–77)
NITRITE UR-MCNC: NEGATIVE — SIGNIFICANT CHANGE UP
PH UR: 5 — SIGNIFICANT CHANGE UP (ref 5–8)
PLATELET # BLD AUTO: 237 K/UL — SIGNIFICANT CHANGE UP (ref 150–400)
POTASSIUM SERPL-MCNC: 4 MMOL/L — SIGNIFICANT CHANGE UP (ref 3.5–5.3)
POTASSIUM SERPL-SCNC: 4 MMOL/L — SIGNIFICANT CHANGE UP (ref 3.5–5.3)
PROT ?TM UR-MCNC: 9 MG/DL — SIGNIFICANT CHANGE UP (ref 0–12)
PROT UR-MCNC: NEGATIVE — SIGNIFICANT CHANGE UP
PROT/CREAT UR-RTO: 0.1 RATIO — SIGNIFICANT CHANGE UP (ref 0–0.2)
RBC # BLD: 5.64 M/UL — SIGNIFICANT CHANGE UP (ref 4.2–5.8)
RBC # FLD: 12.6 % — SIGNIFICANT CHANGE UP (ref 10.3–14.5)
SODIUM SERPL-SCNC: 138 MMOL/L — SIGNIFICANT CHANGE UP (ref 135–145)
SP GR SPEC: 1.01 — SIGNIFICANT CHANGE UP (ref 1.01–1.02)
UROBILINOGEN FLD QL: NEGATIVE — SIGNIFICANT CHANGE UP
WBC # BLD: 9.89 K/UL — SIGNIFICANT CHANGE UP (ref 3.8–10.5)
WBC # FLD AUTO: 9.89 K/UL — SIGNIFICANT CHANGE UP (ref 3.8–10.5)

## 2022-02-16 PROCEDURE — 99232 SBSQ HOSP IP/OBS MODERATE 35: CPT

## 2022-02-16 RX ORDER — APIXABAN 2.5 MG/1
10 TABLET, FILM COATED ORAL EVERY 12 HOURS
Refills: 0 | Status: DISCONTINUED | OUTPATIENT
Start: 2022-02-16 | End: 2022-02-17

## 2022-02-16 RX ORDER — APIXABAN 2.5 MG/1
2 TABLET, FILM COATED ORAL
Qty: 71 | Refills: 0
Start: 2022-02-16 | End: 2022-03-17

## 2022-02-16 RX ORDER — ACETAMINOPHEN 500 MG
650 TABLET ORAL EVERY 6 HOURS
Refills: 0 | Status: DISCONTINUED | OUTPATIENT
Start: 2022-02-16 | End: 2022-02-17

## 2022-02-16 RX ADMIN — Medication 650 MILLIGRAM(S): at 16:19

## 2022-02-16 RX ADMIN — ENOXAPARIN SODIUM 100 MILLIGRAM(S): 100 INJECTION SUBCUTANEOUS at 09:05

## 2022-02-16 RX ADMIN — Medication 1 TABLET(S): at 09:05

## 2022-02-16 RX ADMIN — APIXABAN 10 MILLIGRAM(S): 2.5 TABLET, FILM COATED ORAL at 12:14

## 2022-02-16 RX ADMIN — APIXABAN 10 MILLIGRAM(S): 2.5 TABLET, FILM COATED ORAL at 21:56

## 2022-02-16 NOTE — PROGRESS NOTE ADULT - SUBJECTIVE AND OBJECTIVE BOX
Reason for Admission: Chest Pain/Recurrent PE  History of Present Illness:   56M with  PMH of PE (dx'ed 2017; on AC w Eliquis x 6mths and subsequent cleared to be off AC) presents with chest pain. Chest Pain 4-6/10 substernal/left sided pressure/aching pain without radiation. Similar pain to prior PE but different location (previously pain was on left lower chest wall along anterior axillary line). Associated non-productive cough, pleurisy and fatigue. Denies fever, chills, syncope, nausea, vomiting, abdominal pain/discomfort, weight gain/loss. Of note patient with left leg cramping and ankle swelling that was present several weeks ago. Wore compression stalking with improvement in symptoms.     In the ER, afebrile, /113, RR 18-20, 98% on RA, WBC 12.08, Cr 1.39 (Baseline), Troponin/BNP Negative, CTA chest with bilateral main pulmonary artery PE's without signs of heart strain.     REVIEW OF SYSTEMS:  All other review of systems is negative unless indicated above    Vital Signs Last 24 Hrs  T(C): 36.5 (16 Feb 2022 08:05), Max: 36.5 (15 Feb 2022 18:25)  T(F): 97.7 (16 Feb 2022 08:05), Max: 97.7 (15 Feb 2022 18:25)  HR: 82 (16 Feb 2022 08:05) (82 - 94)  BP: 155/94 (16 Feb 2022 08:05) (153/99 - 164/95)  BP(mean): --  RR: 18 (16 Feb 2022 08:05) (18 - 18)  SpO2: 100% (16 Feb 2022 08:05) (100% - 100%)    PHYSICAL EXAM:    Constitutional: NAD, awake and alert, well-developed  HEENT: PERR, EOMI, Normal Hearing, MMM  Neck: Soft and supple, No LAD, No JVD  Respiratory: Breath sounds are clear bilaterally, No wheezing, rales or rhonchi  Cardiovascular: S1 and S2, regular rate and rhythm, no Murmurs, gallops or rubs  Gastrointestinal: Bowel Sounds present, soft, nontender, nondistended, no guarding, no rebound  Extremities: No peripheral edema  Vascular: 2+ peripheral pulses  Neurological: A/O x 3, no focal deficits  Musculoskeletal: 5/5 strength b/l upper and lower extremities  Skin: No rashes    Medications:  MEDICATIONS  (STANDING):  apixaban 10 milliGRAM(s) Oral every 12 hours  multivitamin 1 Tablet(s) Oral daily      Labs: All Labs Reviewed:                        16.7   9.89  )-----------( 237      ( 16 Feb 2022 07:45 )             48.6     02-16    138  |  104  |  15  ----------------------------<  109<H>  4.0   |  29  |  1.32<H>    Ca    9.3      16 Feb 2022 07:45  Mg     2.4     02-16    TPro  8.0  /  Alb  3.9  /  TBili  0.5  /  DBili  x   /  AST  28  /  ALT  40  /  AlkPhos  82  02-15    PT/INR - ( 15 Feb 2022 13:50 )   PT: 17.1 sec;   INR: 1.50 ratio         PTT - ( 15 Feb 2022 13:50 )  PTT:31.8 sec        RADIOLOGY/EKG: r< from: US Duplex Venous Lower Ext Complete, Bilateral (02.15.22 @ 15:39) >    ACC: [id number] EXAM:  US DPLX LWR EXT VEINS COMPL BI                          PROCEDURE DATE:  02/15/2022          INTERPRETATION:  CLINICAL INFORMATION: Pulmonary embolus    COMPARISON: None available.    TECHNIQUE: Duplex sonography of the BILATERAL LOWER extremity veins with   color and spectral Doppler, with and without compression.    FINDINGS:    RIGHT:  Normal compressibility of the RIGHT common femoral, femoral and popliteal   veins.  Doppler examination shows normal spontaneous and phasic flow.  Thrombosis of the right gastrocnemius vein.    LEFT:  Normal compressibility of the LEFT common femoral, and femoral vein.   Thrombosis the left popliteal vein and left posterior tibial vein..  Doppler examination shows normal spontaneous and phasic flow.      IMPRESSION:  Thrombosis of the left popliteal vein and left posterior tibial vein.   Thrombosis of the right gastrocnemius vein.    < end of copied text >        DVT PPX: eliquis    Advance Directive: full code     Disposition: OK tomorrow

## 2022-02-16 NOTE — CONSULT NOTE ADULT - PROBLEM SELECTOR RECOMMENDATION 9
Appears to be spontaneous  Recurrent  Previous pulmonary embolism in 2017 was also essentially unprovoked    Hypercoagulable work-up as indicated  This Should be done 2 to 3 months after an acute embolus to Advise getting false positive test results    Patient would need to follow-up with pulmonary and have a hematology evaluation about 2 months after discharge    He is on appropriate anticoagulation at this time    He may be a candidate for long-term anticoagulation at a lower dose after the initial 6 months given the recurrence Of unprovoked pulmonary embolism    Patient Is all his physicians in [facility] where he works, His work-up can be done there or he can follow-up with me as an outpatient in 2 months time

## 2022-02-16 NOTE — PROGRESS NOTE ADULT - ASSESSMENT
Recurrent Pulmonary Embolism Bilateral Main Stem (unprovoked)  Personal History of Pulmonary Embolism 2017 s/p 6 months Eliquis with reported subsequent resolution and discontinuation of AC  Chest pain due to above.  Reactive Leukocytosis due to above. May be chronic. WBC 13K in May 2021  -Troponin negative. Continue to trend  -TTE - with preserved EF   -CTA/LE US findings noted. No signs of pneumonia  - started on loading dose of eliquis   -Hemodynamically stable and not hypoxic. O2 PRN if and only if necessary   -Given the nature of recurrent of PE and bialteral DVT without any provoking features, will likely require life long AC. Can follow up outpatient for hypercoaguable panel  -Hgb on the high normal. Reported snoring at night. May benefit from outpatient sleep study and monitoring of Hgb/hct (for primary vs secondary polycythemia)  - Heme/onc consult appreciated     HTN Urgency POA  -PRN Rx for elevated BP.  -could be increased due to situational vs PE  -May required scheduled rx for better BP control.     CKD III. At baseline  -Continue to monitor Cr and Electrolytes.  - Nephrology consult appreciated     Left message for [phone] Alan Dechiario

## 2022-02-16 NOTE — PHARMACOTHERAPY INTERVENTION NOTE - COMMENTS
Counseled patient on new start Eliquis - discussed avoidance of supplements which may increase risk of bleeding.
Medication history complete, [facility]
Confirmed outpatient coverage of Eliquis - $600 due to $2000 deductible; patient is agreeable

## 2022-02-17 ENCOUNTER — TRANSCRIPTION ENCOUNTER (OUTPATIENT)
Age: 57
End: 2022-02-17

## 2022-02-17 VITALS
OXYGEN SATURATION: 99 % | HEART RATE: 88 BPM | RESPIRATION RATE: 16 BRPM | DIASTOLIC BLOOD PRESSURE: 91 MMHG | SYSTOLIC BLOOD PRESSURE: 154 MMHG | TEMPERATURE: 98 F

## 2022-02-17 LAB
ADD ON TEST-SPECIMEN IN LAB: SIGNIFICANT CHANGE UP
ANION GAP SERPL CALC-SCNC: 6 MMOL/L — SIGNIFICANT CHANGE UP (ref 5–17)
BUN SERPL-MCNC: 16 MG/DL — SIGNIFICANT CHANGE UP (ref 7–23)
CALCIUM SERPL-MCNC: 9.6 MG/DL — SIGNIFICANT CHANGE UP (ref 8.5–10.1)
CHLORIDE SERPL-SCNC: 104 MMOL/L — SIGNIFICANT CHANGE UP (ref 96–108)
CO2 SERPL-SCNC: 28 MMOL/L — SIGNIFICANT CHANGE UP (ref 22–31)
CREAT SERPL-MCNC: 1.33 MG/DL — HIGH (ref 0.5–1.3)
GLUCOSE SERPL-MCNC: 109 MG/DL — HIGH (ref 70–99)
HCT VFR BLD CALC: 50.7 % — HIGH (ref 39–50)
HGB BLD-MCNC: 17.2 G/DL — HIGH (ref 13–17)
MCHC RBC-ENTMCNC: 29.3 PG — SIGNIFICANT CHANGE UP (ref 27–34)
MCHC RBC-ENTMCNC: 33.9 GM/DL — SIGNIFICANT CHANGE UP (ref 32–36)
MCV RBC AUTO: 86.4 FL — SIGNIFICANT CHANGE UP (ref 80–100)
PLATELET # BLD AUTO: 275 K/UL — SIGNIFICANT CHANGE UP (ref 150–400)
POTASSIUM SERPL-MCNC: 4.1 MMOL/L — SIGNIFICANT CHANGE UP (ref 3.5–5.3)
POTASSIUM SERPL-SCNC: 4.1 MMOL/L — SIGNIFICANT CHANGE UP (ref 3.5–5.3)
RBC # BLD: 5.87 M/UL — HIGH (ref 4.2–5.8)
RBC # FLD: 12.7 % — SIGNIFICANT CHANGE UP (ref 10.3–14.5)
SODIUM SERPL-SCNC: 138 MMOL/L — SIGNIFICANT CHANGE UP (ref 135–145)
WBC # BLD: 9.17 K/UL — SIGNIFICANT CHANGE UP (ref 3.8–10.5)
WBC # FLD AUTO: 9.17 K/UL — SIGNIFICANT CHANGE UP (ref 3.8–10.5)

## 2022-02-17 PROCEDURE — 99239 HOSP IP/OBS DSCHRG MGMT >30: CPT

## 2022-02-17 PROCEDURE — 93975 VASCULAR STUDY: CPT | Mod: 26

## 2022-02-17 RX ORDER — RNA INGREDIENT BNT-162B2 0.23 G/1.8ML
0.3 INJECTION, SUSPENSION INTRAMUSCULAR
Qty: 0 | Refills: 0 | DISCHARGE

## 2022-02-17 RX ADMIN — APIXABAN 10 MILLIGRAM(S): 2.5 TABLET, FILM COATED ORAL at 09:09

## 2022-02-17 RX ADMIN — Medication 1 TABLET(S): at 09:09

## 2022-02-17 NOTE — DISCHARGE NOTE PROVIDER - NSDCFUADDINST_GEN_ALL_CORE_FT
- follow up labs outpatient  - follow up with hematology/oncology, pulmonology, nephrology and PCP in 5-7 days

## 2022-02-17 NOTE — DISCHARGE NOTE PROVIDER - CARE PROVIDER_API CALL
Junaid Vallecillo  INTERNAL MEDICINE  [address] [place] [address]  Phone: [phone]  Fax: [phone]  Follow Up Time:     Gabriela Eubanks  [facility]  [address], [address] [place] [address]  Phone: [phone]  Fax: [phone]  Follow Up Time:     Gurjit Graves)  Internal Medicine; Nephrology  [address] [place] [address]  Phone: [phone]  Fax: [phone]  Follow Up Time:

## 2022-02-17 NOTE — DISCHARGE NOTE PROVIDER - CARE PROVIDERS DIRECT ADDRESSES
,DirectAddress_Unknown,DirectAddress_Unknown,[email or web address]

## 2022-02-17 NOTE — DISCHARGE NOTE NURSING/CASE MANAGEMENT/SOCIAL WORK - NSDCPEFALRISK_GEN_ALL_CORE
For information on Fall & Injury Prevention, visit: [email or web address] OR  [email or web address] OR  https://www.cdc.gov/steadi/patient.html

## 2022-02-17 NOTE — DISCHARGE NOTE PROVIDER - NSDCMRMEDTOKEN_GEN_ALL_CORE_FT
apixaban 5 mg oral tablet: 10 mg 2 tab(s) orally every 12 hours for 11 more doses and then 5 mg - 1 tab orally every 12 hrs   biotin 1000 mcg oral tablet: 1 tab(s) orally once a day  cyanocobalamin 500 mcg oral tablet: 1 tab(s) orally once a day  Fish Oil oral capsule: 1 cap(s) orally once a day  lysine 500 mg oral tablet: 1 tab(s) orally once a day  Multiple Vitamins oral tablet: 1 tab(s) orally once a day  Natural Tears preserved ophthalmic solution: 1 drop(s) in each eye 2 times a day, As Needed - for dry eyes  Simply Saline 0.9% nasal spray: 1 spray(s) in each nostril 2 times a day  triamcinolone 0.1% topical cream: Apply topically to affected area 2 times a day as needed  Vitamin D3 25 mcg (1000 intl units) oral tablet: 1 tab(s) orally once a day

## 2022-02-17 NOTE — DISCHARGE NOTE NURSING/CASE MANAGEMENT/SOCIAL WORK - PATIENT PORTAL LINK FT
You can access the FollowMyHealth Patient Portal offered by [facility] by registering at the following website: [email or web address]. By joining [facility]’s FollowMyHealth portal, you will also be able to view your health information using other applications (apps) compatible with our system.

## 2022-02-17 NOTE — CONSULT NOTE ADULT - ASSESSMENT
55 yo male with hx of PE in 2017 ( unprovoked ) and now presents with cp and leg cramps with CTA + bilateral PE and bilateral DVT on sono    renal eval called for elevated creatinine    Susu vs CKD 3 ?    hx of NSAID use in past , BP noted elevated here - no on NBP meds    UA bland sediment here   no proteinuria to suggest Nephrotic syndrome    check renal dopper for Alexus and veins for RVT   liberal water intake   avoid NSAID    monitor after recent contrast dye load   pt states all his medical care in [place] and would like to continue workup if warranted there w his PCP   liberal water intake ( hx of renal stones)     Acute PE    on a/c    hypercoag workup per Hematology       d/w NP jaquan earlier today     Thank you for the courtesy of this consult. We will follow this patient with you.   Management is subject to change if new information becomes available or patient condition changes.    
1) Bilateral PE  2) Abnormal CT Chest  3) Dyspnea  4) Anticoagulated  5) Bilateral DVT    56M with  PMH of PE (dx'ed 2017; on AC w Eliquis x 6mths and subsequent cleared to be off AC) presents with chest pain. Chest Pain 4-6/10 substernal/left sided pressure/aching pain without radiation. Similar pain to prior PE but different location (previously pain was on left lower chest wall along anterior axillary line). Associated non-productive cough, pleurisy and fatigue. Denies fever, chills, syncope, nausea, vomiting, abdominal pain/discomfort, weight gain/loss. Of note patient with left leg cramping and ankle swelling that was present several weeks ago. Wore compression stalking with improvement in symptoms.   Echocardiogram did not reveal RV dysfunction  States that he has no prior history of hypercoagulability diagnosed with PE 5 years ago  No prior history of cancer   Currently on Eliquis   Strongly recommend that the patient complete full work up prior to discharge but states that he is reluctant to stay and would like to leave no matter what by the end of the day  He also states that he has an established Pulmonologist, Cardiologist, Hematologist appointment tomorrow with [facility].  Discussed with primary service; independently reviewed imaging and records  Will continue to monitor

## 2022-02-17 NOTE — CONSULT NOTE ADULT - SUBJECTIVE AND OBJECTIVE BOX
56M with  PMH of PE (dx'ed 2017; on AC w Eliquis x 6mths later taken off ) presents with chest pain   Chest Pain 4-6/10 substernal/left sided pressure/aching pain without radiation.   In the ER, afebrile, /113, RR 18-20, 98% on RA, WBC 12.08, Cr 1.39 (Baseline), Troponin/BNP Negative, CTA chest with bilateral main pulmonary artery PE's without signs of heart strain w bilateral DVT's    today    pt feelng better and no cp or sob    was hoping to go home   was told by his PCP of mildly elevated creatinine - no formal workup    pt used to take NSAID -  stopped         PAST MEDICAL & SURGICAL HISTORY:  Pulmonary embolism    No significant past surgical history        MEDICATIONS  (STANDING):  apixaban 10 milliGRAM(s) Oral every 12 hours  multivitamin 1 Tablet(s) Oral daily      Allergies    No Known Allergies    Intolerances        SOCIAL HISTORY:  Denies ETOh,Smoking,     FAMILY HISTORY:  FH: multiple myeloma (Mother)   age 78    FH: CHF (congestive heart failure) (Father)   age 87        REVIEW OF SYSTEMS:    CONSTITUTIONAL: No weakness, fevers or chills  EYES/ENT: No visual changes;  No vertigo or throat pain   NECK: No pain or stiffness  RESPIRATORY: No cough, wheezing, hemoptysis; No shortness of breath  CARDIOVASCULAR: No chest pain or palpitations  GASTROINTESTINAL: No abdominal or epigastric pain. No nausea, vomiting, or hematemesis; No diarrhea or constipation. No melena or hematochezia.  GENITOURINARY: No dysuria, frequency or hematuria  NEUROLOGICAL: No numbness or weakness  SKIN: No itching, burning, rashes, or lesions   All other review of systems is negative unless indicated above.    VITAL:  Vital Signs Last 24 Hrs  T(C): 36.5 (2022 15:14), Max: 36.5 (2022 08:05)  T(F): 97.7 (2022 15:14), Max: 97.7 (2022 08:05)  HR: 87 (2022 15:14) (82 - 87)  BP: 150/100 (2022 15:14) (150/100 - 155/94)  BP(mean): 115 (2022 15:14) (115 - 115)  RR: 18 (2022 15:14) (18 - 18)  SpO2: 100% (2022 15:14) (100% - 100%)    PHYSICAL EXAM:    Constitutional: NAD  HEENT:  EOMI,  MMM  Neck: No LAD, No JVD  Respiratory: CTAB  Cardiovascular: S1 and S2  Gastrointestinal: BS+, soft, NT/ND  Extremities: No peripheral edema  : No Hdz  Skin: No rashes  Access: Not applicable    LABS:               138    |  104    |  15     ----------------------------<  109       2022 07:45  4.0     |  29     |  1.32     139    |  107    |  14     ----------------------------<  126       15 Feb 2022 13:14  4.4     |  28     |  1.39     Ca    9.3        2022 07:45  Ca    9.5        15 Feb 2022 13:14      Mg     2.4       2022 07:45    TPro  8.0    /  Alb  3.9    /  TBili  0.5    /        15 Feb 2022 13:14  DBili  x      /  AST  28     /  ALT  40     /  AlkPhos  82                        16.7   9.89  )-----------( 237      ( 2022 07:45 )             48.6       TPro  8.0  /  Alb  3.9  /  TBili  0.5  /  DBili  x   /  AST  28  /  ALT  40  /  AlkPhos  82  02-15      Urine Studies:  Urinalysis Basic - ( 2022 18:10 )    Color: Yellow / Appearance: Clear / S.015 / pH: x  Gluc: x / Ketone: Negative  / Bili: Negative / Urobili: Negative   Blood: x / Protein: Negative / Nitrite: Negative   Leuk Esterase: Negative / RBC: 0-2 /HPF / WBC 0-2   Sq Epi: x / Non Sq Epi: Negative / Bacteria: Negative      Creatinine, Random Urine: 107 mg/dL ( @ 18:10)  Protein/Creatinine Ratio Calculation: 0.1 Ratio ( @ 18:10)        RADIOLOGY & ADDITIONAL STUDIES:                   
Patient is a 56y old  Male who presents with a chief complaint of Chest Pain/Recurrent PE (2022 18:08)      HPI:  56M with  PMH of PE (dx'ed 2017; on AC w Eliquis x 6mths and subsequent cleared to be off AC) presents with chest pain. Chest Pain 4-6/10 substernal/left sided pressure/aching pain without radiation. Similar pain to prior PE but different location (previously pain was on left lower chest wall along anterior axillary line). Associated non-productive cough, pleurisy and fatigue. Denies fever, chills, syncope, nausea, vomiting, abdominal pain/discomfort, weight gain/loss. Of note patient with left leg cramping and ankle swelling that was present several weeks ago. Wore compression stalking with improvement in symptoms.   Echocardiogram did not reveal RV dysfunction  States that he has no prior history of hypercoagulability diagnosed with PE 5 years ago  No prior history of cancer   Currently on Eliquis       PREVIOUS DIAGNOSTIC TESTING:      MEDICATIONS  (STANDING):  apixaban 10 milliGRAM(s) Oral every 12 hours  multivitamin 1 Tablet(s) Oral daily    MEDICATIONS  (PRN):  acetaminophen     Tablet .. 650 milliGRAM(s) Oral every 6 hours PRN Mild Pain (1 - 3)  aluminum hydroxide/magnesium hydroxide/simethicone Suspension 30 milliLiter(s) Oral every 4 hours PRN Dyspepsia  calcium carbonate    500 mG (Tums) Chewable 3 Tablet(s) Chew every 6 hours PRN Dyspepsia  ondansetron Injectable 4 milliGRAM(s) IV Push every 6 hours PRN Nausea  senna 2 Tablet(s) Oral at bedtime PRN Constipation      FAMILY HISTORY:  FH: multiple myeloma (Mother)   age 78    FH: CHF (congestive heart failure) (Father)   age 87        SOCIAL HISTORY:  ***    REVIEW OF SYSTEM:  denies dyspnea at the present time     Vital Signs Last 24 Hrs  T(C): 36.4 (2022 09:09), Max: 36.5 (2022 15:14)  T(F): 97.5 (2022 09:09), Max: 97.7 (2022 15:14)  HR: 88 (2022 09:09) (79 - 88)  BP: 154/91 (2022 09:09) (135/95 - 154/91)  BP(mean): 115 (2022 15:14) (115 - 115)  RR: 16 (2022 09:09) (16 - 18)  SpO2: 99% (2022 09:09) (96% - 100%)    I&O's Summary    PHYSICAL EXAM  General Appearance: cooperative, no acute distress,   HEENT: PERRL, conjunctiva clear, EOM's intact, non injected pharynx, no exudate, TM   normal  Neck: Supple,   Back: Symmetric, no  tenderness,no soft tissue tenderness  Lungs: clear bilaterally    Heart: +S1,S2  Abdomen: non distended  Extremities: no cyanosis or edema, no joint swelling  Skin: Skin color, texture normal, no rashes   Neurologic: Alert and oriented X3 , cranial nerves intact, sensory and motor normal,    ECG:    LABS:                          17.2   9.17  )-----------( 275      ( 2022 07:36 )             50.7     02-17    138  |  104  |  16  ----------------------------<  109<H>  4.1   |  28  |  1.33<H>    Ca    9.6      2022 07:36  Mg     2.4     02-16    TPro  8.0  /  Alb  3.9  /  TBili  0.5  /  DBili  x   /  AST  28  /  ALT  40  /  AlkPhos  82  02-15          Pro BNP  88 -15 @ 13:14  D Dimer  -- 15 @ 13:14    PT/INR - ( 15 Feb 2022 13:50 )   PT: 17.1 sec;   INR: 1.50 ratio         PTT - ( 15 Feb 2022 13:50 )  PTT:31.8 sec  Urinalysis Basic - ( 2022 18:10 )    Color: Yellow / Appearance: Clear / S.015 / pH: x  Gluc: x / Ketone: Negative  / Bili: Negative / Urobili: Negative   Blood: x / Protein: Negative / Nitrite: Negative   Leuk Esterase: Negative / RBC: 0-2 /HPF / WBC 0-2   Sq Epi: x / Non Sq Epi: Negative / Bacteria: Negative            RADIOLOGY & ADDITIONAL STUDIES:    
Patient is a 56y old  Male who presents with a chief complaint of Chest Pain/Recurrent PE (2022 15:01)      HPI:  56M with  PMH of PE (dx'ed 2017; on AC w Eliquis x 6mths and subsequent cleared to be off AC)  Patient apparently had a work-up done by her pulmonary physician at that time in [facility]. He does not recall Any results ofthat work-up  At that time patient has been sitting in a chair Every night in the hospital caring for his mother who eventually passed    He now presents with chest pain And shortness of breath and has been found to have bilateral pulmonary embolism  There is an associated DVT In the left popliteal vein.    Patient currently on Eliquis    He had a CT scan of his abdomen and pelvis in May 2021 which showed a renal calculus But no evidence of malignancy      PAST MEDICAL & SURGICAL HISTORY:  Pulmonary embolism    No significant past surgical history        REVIEW OF SYSTEMS    General:	  Patient comfortable  Shortness of breath and chest pain have improved  Mild discomfort in the left ankle    Allergies    No Known Allergies    Intolerances          FAMILY HISTORY:  FH: multiple myeloma (Mother)   age 78  No family history of DVT or pulmonary embolism    FH: CHF (congestive heart failure) (Father)   age 87        Home Medications:  biotin 1000 mcg oral tablet: 1 tab(s) orally once a day (15 Feb 2022 15:46)  cyanocobalamin 500 mcg oral tablet: 1 tab(s) orally once a day (15 Feb 2022 15:46)  Fish Oil oral capsule: 1 cap(s) orally once a day (15 Feb 2022 15:46)  lysine 500 mg oral tablet: 1 tab(s) orally once a day (15 Feb 2022 15:46)  Multiple Vitamins oral tablet: 1 tab(s) orally once a day (15 Feb 2022 15:46)  Natural Tears preserved ophthalmic solution: 1 drop(s) in each eye 2 times a day, As Needed - for dry eyes (15 Feb 2022 15:46)  Pfizer-BioNTech COVID-19 Vaccine PF 30 mcg/0.3 mL intramuscular suspension: 0.3 milliliter(s) intramuscular once  ***3rd dose *** (15 Feb 2022 15:46)  Simply Saline 0.9% nasal spray: 1 spray(s) in each nostril 2 times a day (15 Feb 2022 15:46)  triamcinolone 0.1% topical cream: Apply topically to affected area 2 times a day as needed (15 Feb 2022 15:46)  Vitamin D3 25 mcg (1000 intl units) oral tablet: 1 tab(s) orally once a day (15 Feb 2022 15:46)      MEDICATIONS  (STANDING):  apixaban 10 milliGRAM(s) Oral every 12 hours  multivitamin 1 Tablet(s) Oral daily    MEDICATIONS  (PRN):  acetaminophen     Tablet .. 650 milliGRAM(s) Oral every 6 hours PRN Mild Pain (1 - 3)  aluminum hydroxide/magnesium hydroxide/simethicone Suspension 30 milliLiter(s) Oral every 4 hours PRN Dyspepsia  calcium carbonate    500 mG (Tums) Chewable 3 Tablet(s) Chew every 6 hours PRN Dyspepsia  ondansetron Injectable 4 milliGRAM(s) IV Push every 6 hours PRN Nausea  senna 2 Tablet(s) Oral at bedtime PRN Constipation      PHYSICAL EXAM:  Vital Signs Last 24 Hrs  T(C): 36.5 (2022 15:14), Max: 36.5 (15 Feb 2022 18:25)  T(F): 97.7 (2022 15:14), Max: 97.7 (15 Feb 2022 18:25)  HR: 87 (2022 15:14) (82 - 94)  BP: 150/100 (2022 15:14) (150/100 - 164/95)  BP(mean): 115 (2022 15:14) (115 - 115)  RR: 18 (2022 15:14) (18 - 18)  SpO2: 100% (2022 15:14) (100% - 100%)        Gen:   Patient appears well  Comfortable  No lymphadenopathy  Chest clear to auscultation  Abdomen soft nontender  Minimal edema left ankle      LABS:                        16.7   9.89  )-----------( 237      ( 2022 07:45 )             48.6     2022 07:45    138    |  104    |  15     ----------------------------<  109    4.0     |  29     |  1.32     Ca    9.3        2022 07:45  Mg     2.4       2022 07:45    TPro  8.0    /  Alb  3.9    /  TBili  0.5    /  DBili  x      /  AST  28     /  ALT  40     /  AlkPhos  82     15 Feb 2022 13:14    LIVER FUNCTIONS - ( 15 Feb 2022 13:14 )  Alb: 3.9 g/dL / Pro: 8.0 gm/dL / ALK PHOS: 82 U/L / ALT: 40 U/L / AST: 28 U/L / GGT: x           PT/INR - ( 15 Feb 2022 13:50 )   PT: 17.1 sec;   INR: 1.50 ratio         PTT - ( 15 Feb 2022 13:50 )  PTT:31.8 sec      RADIOLOGY & ADDITIONAL STUDIES:      ACC: [id number] EXAM:  CT ANGIO CHEST PULM ART [facility]                          PROCEDURE DATE:  02/15/2022          INTERPRETATION:  INDICATION, CLINICAL INFORMATION: chest pain, history of   pulmonary embolus  TECHNIQUE: CT pulmonary angiogram of the chest . Uneventful   administration of 90 cc nonionic intravenous Omnipaque 350. Coronal,   sagittal and 3-D/MIP images were reconstructed/reviewed.  COMPARISON: No prior chest CT.    FINDINGS:    PULMONARY VESSELS: Pulmonary emboli within the bilateral main pulmonary   arteries extending into the lobar branches. Main pulmonary artery normal   in diameter.    HEART AND VASCULATURE: Heart size is normal. No CT evidence of right   heart strain. No pericardial effusion. No aortic aneurysm or dissection.    LUNGS, AIRWAYS, PLEURA: Patent central airways. Clear lungs. No pleural   effusions or pneumothorax.    MEDIASTINUM: No mass or lymphadenopathy.    UPPER ABDOMEN: Too small to characterize hypodensity within right lobe of   the liver. Small left renal cyst with peripheral calcification.    BONES AND SOFT TISSUES: No aggressive osseous lesion.    LOWER NECK: Within normal limits.    IMPRESSION:    Pulmonary emboli within the bilateral main pulmonary arteries extending   into the lobar branches. No CT evidence of right heart strain.    These findings were discussed with Dr. WAYNE LUEVANO , on 2/15/2022 2:17 PM   with read back.    --- End of Report ---            LENKA DOMINGUEZ MD; Attending Radiologist  This document has been electronically signed. Feb 15 2022  2:22PM        ACC: [id number] EXAM:  US DPLX LWR EXT VEINS COMPL BI                          PROCEDURE DATE:  02/15/2022          INTERPRETATION:  CLINICAL INFORMATION: Pulmonary embolus    COMPARISON: None available.    TECHNIQUE: Duplex sonography of the BILATERAL LOWER extremity veins with   color and spectral Doppler, with and without compression.    FINDINGS:    RIGHT:  Normal compressibility of the RIGHT common femoral, femoral and popliteal   veins.  Doppler examination shows normal spontaneous and phasic flow.  Thrombosis of the right gastrocnemius vein.    LEFT:  Normal compressibility of the LEFT common femoral, and femoral vein.   Thrombosis the left popliteal vein and left posterior tibial vein..  Doppler examination shows normal spontaneous and phasic flow.      IMPRESSION:  Thrombosis of the left popliteal vein and left posterior tibial vein.   Thrombosis of the right gastrocnemius vein.    --- End of Report ---            JEROMY RODRIGUEZ MD; Attending Radiologist    CT scan with IV contrast of the abdomen done in May 2021 showed no evidence of malignancy but renal calculus

## 2022-02-17 NOTE — DISCHARGE NOTE PROVIDER - PROVIDER TOKENS
PROVIDER:[TOKEN:[[id number]]],PROVIDER:[TOKEN:[[id number]]],PROVIDER:[TOKEN:[[id number]]]

## 2022-02-17 NOTE — DISCHARGE NOTE PROVIDER - HOSPITAL COURSE
56M with  PMH of PE (dx'ed 2017; on AC w Eliquis x 6mths and subsequent cleared to be off AC) presents with chest pain. Chest Pain 4-6/10 substernal/left sided pressure/aching pain without radiation. Similar pain to prior PE but different location (previously pain was on left lower chest wall along anterior axillary line). Associated non-productive cough, pleurisy and fatigue. Denies fever, chills, syncope, nausea, vomiting, abdominal pain/discomfort, weight gain/loss. Of note patient with left leg cramping and ankle swelling that was present several weeks ago. Wore compression stalking with improvement in symptoms.   Echocardiogram did not reveal RV dysfunction  States that he has no prior history of hypercoagulability diagnosed with PE 5 years ago  No prior history of cancer Currently on Eliquis     # B/L PE and B/L LE Thrombosis   Recurrent  started on eliquis protocol for PE/DVT - 10 mg bid for 14 doses and then 5 mg bid   prescription sent to Pharmacy - pt. hilarying to pay the co-pay, also received a 30 day coupon from Hospital Pharmacist   Previous pulmonary embolism in 2017 was also essentially unprovoked  Hypercoagulable work-up as indicated  follow up labs not resulted yet   Patient would need to follow-up with pulmonary and have a hematology evaluation about 2 months after discharge  He is on appropriate anticoagulation at this time  Heme/onc consult appreciated: may be a candidate for long-term anticoagulation at a lower dose after the initial 6 months given the recurrence Of unprovoked pulmonary embolism  Pulmonology consult appreciated: Strongly recommend that the patient complete full work up prior to discharge but states that he is reluctant to stay and would like to leave no matter what by the end of the day  He also states that he has an established Pulmonologist, Cardiologist, Hematologist appointment tomorrow with [facility].    # JOSE MANUEL on CKD 3    hx of NSAID use in past , BP noted elevated here - no on NBP meds    UA bland sediment here   no proteinuria to suggest Nephrotic syndrome   creatinine improved   renal dopper - negative for DAVID    liberal water intake   avoid NSAID    monitor after recent contrast dye load   pt states all his medical care in [place] and would like to continue workup if warranted there w his PCP   liberal water intake ( hx of renal stones)  follow up with nephrology outpatient     # HTN  pt. with elevated SBP and DBP, not willing to take meds  states it is white coat syndrome  advised to follow up with PCP     Pt. is stable for discharge, will need to follow up with hematology-oncology, pulmonology, nephrology and PCP.    PHYSICAL EXAM:  Vital Signs Last 24 Hrs  T(C): 36.4 (17 Feb 2022 09:09), Max: 36.5 (16 Feb 2022 15:14)  T(F): 97.5 (17 Feb 2022 09:09), Max: 97.7 (16 Feb 2022 15:14)  HR: 88 (17 Feb 2022 09:09) (79 - 88)  BP: 154/91 (17 Feb 2022 09:09) (135/95 - 154/91)  BP(mean): 115 (16 Feb 2022 15:14) (115 - 115)  RR: 16 (17 Feb 2022 09:09) (16 - 18)  SpO2: 99% (17 Feb 2022 09:09) (96% - 100%)  Constitutional: NAD, awake and alert, well-developed  HEENT: PERR, EOMI, Normal Hearing, MMM  Neck: Soft and supple, No LAD, No JVD  Respiratory: Breath sounds are clear bilaterally, No wheezing, rales or rhonchi  Cardiovascular: S1 and S2, regular rate and rhythm, no Murmurs, gallops or rubs  Gastrointestinal: Bowel Sounds present, soft, nontender, nondistended, no guarding, no rebound  Extremities: No peripheral edema  Vascular: 2+ peripheral pulses  Neurological: A/O x 3, no focal deficits  Musculoskeletal: 5/5 strength b/l upper and lower extremities  Skin: No rashes

## 2022-02-17 NOTE — DISCHARGE NOTE PROVIDER - NSDCCPCAREPLAN_GEN_ALL_CORE_FT
PRINCIPAL DISCHARGE DIAGNOSIS  Diagnosis: Pulmonary embolism  Assessment and Plan of Treatment: # B/L PE and B/L LE Thrombosis   Recurrent  started on eliquis protocol for PE/DVT - 10 mg bid for 14 doses and then 5 mg bid   prescription sent to Pharmacy - pt. clau to pay the co-pay, also received a 30 day coupon from Hospital Pharmacist   Previous pulmonary embolism in 2017 was also essentially unprovoked  Hypercoagulable work-up as indicated  follow up labs not resulted yet   Patient would need to follow-up with pulmonary and have a hematology evaluation about 2 months after discharge  He is on appropriate anticoagulation at this time  Heme/onc consult appreciated: may be a candidate for long-term anticoagulation at a lower dose after the initial 6 months given the recurrence Of unprovoked pulmonary embolism  Pulmonology consult appreciated: Strongly recommend that the patient complete full work up prior to discharge but states that he is reluctant to stay and would like to leave no matter what by the end of the day  He also states that he has an established Pulmonologist, Cardiologist, Hematologist appointment tomorrow with [facility].        SECONDARY DISCHARGE DIAGNOSES  Diagnosis: Acute kidney injury superimposed on CKD  Assessment and Plan of Treatment: # JOSE MANUEL on CKD 3    hx of NSAID use in past , BP noted elevated here - no on NBP meds    UA bland sediment here   no proteinuria to suggest Nephrotic syndrome   creatinine improved   renal dopper - negative for DAVID    liberal water intake   avoid NSAID    monitor after recent contrast dye load   pt states all his medical care in [place] and would like to continue workup if warranted there w his PCP   liberal water intake ( hx of renal stones)  follow up with nephrology outpatient       Diagnosis: HTN (hypertension)  Assessment and Plan of Treatment: # HTN  pt. with elevated SBP and DBP, not willing to take meds  states it is white coat syndrome  advised to follow up with PCP

## 2022-02-19 LAB — DSDNA AB SER-ACNC: <12 IU/ML — SIGNIFICANT CHANGE UP

## 2022-02-20 LAB
AUTO DIFF PNL BLD: NEGATIVE — SIGNIFICANT CHANGE UP
C-ANCA SER-ACNC: NEGATIVE — SIGNIFICANT CHANGE UP
P-ANCA SER-ACNC: NEGATIVE — SIGNIFICANT CHANGE UP

## 2022-02-21 DIAGNOSIS — I12.9 HYPERTENSIVE CHRONIC KIDNEY DISEASE WITH STAGE 1 THROUGH STAGE 4 CHRONIC KIDNEY DISEASE, OR UNSPECIFIED CHRONIC KIDNEY DISEASE: ICD-10-CM

## 2022-02-21 DIAGNOSIS — I82.442 ACUTE EMBOLISM AND THROMBOSIS OF LEFT TIBIAL VEIN: ICD-10-CM

## 2022-02-21 DIAGNOSIS — Z80.7 FAMILY HISTORY OF OTHER MALIGNANT NEOPLASMS OF LYMPHOID, HEMATOPOIETIC AND RELATED TISSUES: ICD-10-CM

## 2022-02-21 DIAGNOSIS — N18.30 CHRONIC KIDNEY DISEASE, STAGE 3 UNSPECIFIED: ICD-10-CM

## 2022-02-21 DIAGNOSIS — I82.461 ACUTE EMBOLISM AND THROMBOSIS OF RIGHT CALF MUSCULAR VEIN: ICD-10-CM

## 2022-02-21 DIAGNOSIS — I16.0 HYPERTENSIVE URGENCY: ICD-10-CM

## 2022-02-21 DIAGNOSIS — Z86.711 PERSONAL HISTORY OF PULMONARY EMBOLISM: ICD-10-CM

## 2022-02-21 DIAGNOSIS — I26.99 OTHER PULMONARY EMBOLISM WITHOUT ACUTE COR PULMONALE: ICD-10-CM

## 2022-02-21 DIAGNOSIS — N17.9 ACUTE KIDNEY FAILURE, UNSPECIFIED: ICD-10-CM

## 2022-02-21 DIAGNOSIS — I82.432 ACUTE EMBOLISM AND THROMBOSIS OF LEFT POPLITEAL VEIN: ICD-10-CM

## 2022-02-21 LAB
ANA PAT FLD IF-IMP: ABNORMAL
ANA TITR SER: ABNORMAL

## 2022-04-04 PROBLEM — I26.99 OTHER PULMONARY EMBOLISM WITHOUT ACUTE COR PULMONALE: Chronic | Status: ACTIVE | Noted: 2022-02-15

## 2022-04-13 ENCOUNTER — APPOINTMENT (OUTPATIENT)
Dept: ORTHOPEDIC SURGERY | Facility: CLINIC | Age: 57
End: 2022-04-13

## 2022-10-10 NOTE — ED PROVIDER NOTE - CROS ED CONS ALL NEG
Called Dr. Madelyn Rodriguez office and spoke to nurse Adelaida Pascual. Adelaida Pascual informed of message below. Writer was advised that they will send referral for hyperthyroidism to office. No further questions or concerns at this time. negative...

## 2023-03-18 ENCOUNTER — NON-APPOINTMENT (OUTPATIENT)
Age: 58
End: 2023-03-18

## 2025-06-18 NOTE — ED STATDOCS - SOCIAL CONCERNS
Slovenian  Austin Hussein [id number].    Spoke with mother about Max using Slovenian .  Explained that he was overdue for labs from his April appointment with Dr. Silva and that he could go to any [place] lab without an appointment and have those drawn and we would call her with the results.   She verbalized understanding and had no further questions.       none None